# Patient Record
Sex: FEMALE
[De-identification: names, ages, dates, MRNs, and addresses within clinical notes are randomized per-mention and may not be internally consistent; named-entity substitution may affect disease eponyms.]

---

## 2018-03-14 ENCOUNTER — HOSPITAL ENCOUNTER (INPATIENT)
Dept: HOSPITAL 88 - ER | Age: 78
LOS: 3 days | Discharge: HOME HEALTH | DRG: 65 | End: 2018-03-17
Attending: INTERNAL MEDICINE | Admitting: INTERNAL MEDICINE
Payer: MEDICARE

## 2018-03-14 VITALS — DIASTOLIC BLOOD PRESSURE: 84 MMHG | SYSTOLIC BLOOD PRESSURE: 134 MMHG

## 2018-03-14 VITALS — SYSTOLIC BLOOD PRESSURE: 127 MMHG | DIASTOLIC BLOOD PRESSURE: 64 MMHG

## 2018-03-14 VITALS — HEIGHT: 63 IN | BODY MASS INDEX: 27.66 KG/M2 | WEIGHT: 156.13 LBS

## 2018-03-14 DIAGNOSIS — Z95.5: ICD-10-CM

## 2018-03-14 DIAGNOSIS — I11.9: ICD-10-CM

## 2018-03-14 DIAGNOSIS — I25.10: ICD-10-CM

## 2018-03-14 DIAGNOSIS — I63.511: Primary | ICD-10-CM

## 2018-03-14 DIAGNOSIS — H53.461: ICD-10-CM

## 2018-03-14 DIAGNOSIS — I69.351: ICD-10-CM

## 2018-03-14 DIAGNOSIS — I48.2: ICD-10-CM

## 2018-03-14 DIAGNOSIS — Z79.01: ICD-10-CM

## 2018-03-14 LAB
ALBUMIN SERPL-MCNC: 4.5 G/DL (ref 3.5–5)
ALBUMIN/GLOB SERPL: 1 {RATIO} (ref 0.8–2)
ALP SERPL-CCNC: 75 IU/L (ref 40–150)
ALT SERPL-CCNC: 12 IU/L (ref 0–55)
ANION GAP SERPL CALC-SCNC: 15.1 MMOL/L (ref 8–16)
BACTERIA URNS QL MICRO: (no result) /HPF
BASOPHILS # BLD AUTO: 0.1 10*3/UL (ref 0–0.1)
BASOPHILS NFR BLD AUTO: 0.5 % (ref 0–1)
BILIRUB UR QL: (no result)
BUN SERPL-MCNC: 19 MG/DL (ref 7–26)
BUN/CREAT SERPL: 23 (ref 6–25)
CALCIUM SERPL-MCNC: 9.6 MG/DL (ref 8.4–10.2)
CHLORIDE SERPL-SCNC: 104 MMOL/L (ref 98–107)
CK MB SERPL-MCNC: 2.1 NG/ML (ref 0–5)
CK SERPL-CCNC: 97 IU/L (ref 29–168)
CLARITY UR: CLEAR
CO2 SERPL-SCNC: 26 MMOL/L (ref 22–29)
COLOR UR: YELLOW
DEPRECATED APTT PLAS QN: 29.3 SECONDS (ref 23.8–35.5)
DEPRECATED INR PLAS: 1.18
DEPRECATED NEUTROPHILS # BLD AUTO: 5.9 10*3/UL (ref 2.1–6.9)
DEPRECATED RBC URNS MANUAL-ACNC: (no result) /HPF (ref 0–5)
EGFRCR SERPLBLD CKD-EPI 2021: > 60 ML/MIN (ref 60–?)
EOSINOPHIL # BLD AUTO: 0.1 10*3/UL (ref 0–0.4)
EOSINOPHIL NFR BLD AUTO: 0.5 % (ref 0–6)
EPI CELLS URNS QL MICRO: (no result) /LPF
ERYTHROCYTE [DISTWIDTH] IN CORD BLOOD: 13.8 % (ref 11.7–14.4)
GLOBULIN PLAS-MCNC: 4.6 G/DL (ref 2.3–3.5)
GLUCOSE SERPLBLD-MCNC: 101 MG/DL (ref 74–118)
HCT VFR BLD AUTO: 41.4 % (ref 34.2–44.1)
HGB BLD-MCNC: 14.4 G/DL (ref 12–16)
KETONES UR QL STRIP.AUTO: NEGATIVE
LEUKOCYTE ESTERASE UR QL STRIP.AUTO: (no result)
LYMPHOCYTES # BLD: 2.9 10*3/UL (ref 1–3.2)
LYMPHOCYTES NFR BLD AUTO: 30.5 % (ref 18–39.1)
MCH RBC QN AUTO: 32.1 PG (ref 28–32)
MCHC RBC AUTO-ENTMCNC: 34.8 G/DL (ref 31–35)
MCV RBC AUTO: 92.2 FL (ref 81–99)
MONOCYTES # BLD AUTO: 0.5 10*3/UL (ref 0.2–0.8)
MONOCYTES NFR BLD AUTO: 5 % (ref 4.4–11.3)
MUCOUS THREADS URNS QL MICRO: (no result)
NEUTS SEG NFR BLD AUTO: 63.3 % (ref 38.7–80)
NITRITE UR QL STRIP.AUTO: NEGATIVE
PLATELET # BLD AUTO: 314 X10E3/UL (ref 140–360)
POTASSIUM SERPL-SCNC: 4.1 MMOL/L (ref 3.5–5.1)
PROT UR QL STRIP.AUTO: (no result)
PROTHROMBIN TIME: 14.1 SECONDS (ref 11.9–14.5)
RBC # BLD AUTO: 4.49 X10E6/UL (ref 3.6–5.1)
SODIUM SERPL-SCNC: 141 MMOL/L (ref 136–145)
SP GR UR STRIP: 1.02 (ref 1.01–1.02)
UROBILINOGEN UR STRIP-MCNC: 0.2 MG/DL (ref 0.2–1)
WBC #/AREA URNS HPF: (no result) /HPF (ref 0–5)

## 2018-03-14 PROCEDURE — 99284 EMERGENCY DEPT VISIT MOD MDM: CPT

## 2018-03-14 PROCEDURE — 85025 COMPLETE CBC W/AUTO DIFF WBC: CPT

## 2018-03-14 PROCEDURE — 83880 ASSAY OF NATRIURETIC PEPTIDE: CPT

## 2018-03-14 PROCEDURE — 84484 ASSAY OF TROPONIN QUANT: CPT

## 2018-03-14 PROCEDURE — 70450 CT HEAD/BRAIN W/O DYE: CPT

## 2018-03-14 PROCEDURE — 71045 X-RAY EXAM CHEST 1 VIEW: CPT

## 2018-03-14 PROCEDURE — 84439 ASSAY OF FREE THYROXINE: CPT

## 2018-03-14 PROCEDURE — 85610 PROTHROMBIN TIME: CPT

## 2018-03-14 PROCEDURE — 83735 ASSAY OF MAGNESIUM: CPT

## 2018-03-14 PROCEDURE — 82550 ASSAY OF CK (CPK): CPT

## 2018-03-14 PROCEDURE — 93306 TTE W/DOPPLER COMPLETE: CPT

## 2018-03-14 PROCEDURE — 81001 URINALYSIS AUTO W/SCOPE: CPT

## 2018-03-14 PROCEDURE — 85730 THROMBOPLASTIN TIME PARTIAL: CPT

## 2018-03-14 PROCEDURE — 80061 LIPID PANEL: CPT

## 2018-03-14 PROCEDURE — 93005 ELECTROCARDIOGRAM TRACING: CPT

## 2018-03-14 PROCEDURE — 93880 EXTRACRANIAL BILAT STUDY: CPT

## 2018-03-14 PROCEDURE — 82553 CREATINE MB FRACTION: CPT

## 2018-03-14 PROCEDURE — 80048 BASIC METABOLIC PNL TOTAL CA: CPT

## 2018-03-14 PROCEDURE — 36415 COLL VENOUS BLD VENIPUNCTURE: CPT

## 2018-03-14 PROCEDURE — 84443 ASSAY THYROID STIM HORMONE: CPT

## 2018-03-14 PROCEDURE — 87086 URINE CULTURE/COLONY COUNT: CPT

## 2018-03-14 PROCEDURE — 80053 COMPREHEN METABOLIC PANEL: CPT

## 2018-03-14 RX ADMIN — ENOXAPARIN SODIUM SCH MG: 60 INJECTION SUBCUTANEOUS at 20:41

## 2018-03-14 NOTE — DIAGNOSTIC IMAGING REPORT
PROCEDURE:

A single AP view of the chest.

 

COMPARISON: None.

 

INDICATIONS:   POSSIBLE STROKE

     

FINDINGS:

Lines/tubes:  None.

 

Lungs:  The lungs are well inflated and clear. There is no evidence of 

pneumonia or pulmonary edema.

 

Pleura:  There is no pleural effusion or pneumothorax.

 

Heart and mediastinum: Mild cardiomegaly. Atherosclerotic 

calcifications in aorta. Mild main pulmonary artery enlargement.

 

Bones:  No acute bony abnormality.

 

IMPRESSION: 

 

Mild cardiomegaly. No acute cardiopulmonary disease.

 

Dictated by:  Nathan Higginbotham M.D. on 3/14/2018 at 11:59     

Electronically approved by:  Nathan Higginbotham M.D. on 3/14/2018 at 11:59

## 2018-03-14 NOTE — DIAGNOSTIC IMAGING REPORT
Exam: Head CT without contrast

History:  Headache

Comparison studies:  None



Technique:

Axial images were obtained from the skull base to the vertex.

Coronal and sagittal images reconstructed from the axial data.

Intravenous contrast: None



Findings:



Scalp: No abnormalities.

Bones: No fractures, blastic or lytic lesions.



Brain sulci: Right lateral parietal and occipital sulci are effaced (see below.

Remain sulci are mild prominent.



Ventricles: The posterior right lateral ventricle is partially effaced. Mild to

been stored dilatation remaining ventricles. No hydrocephalus.



Extra-axial spaces: No masses, no fluid collection. 



Parenchyma: 

Wedge-shaped hypodensity compatible with recent, moderate size infarct centered

in the right inferior parietal lobule extends anteriorly to the posterior right

insula and right superior temporal lobe and posteriorly to the right lateral

occipital lobe in the distal right MCA territory. Infarct extends to the right

MCA and PCA cortical border zone. There is no lyssa hemorrhagic conversion at

this time. There are a few linear hyperdense vessels within the infarct bed

which may possibly reflect small thrombosed vessels within the distal right MCA

territory.



There is a small chronic lacunar infarct in the left anterolateral putamen.

Scattered discrete and ill-defined ill-defined confluent hypodensities in the

supratentorial white matter are nonspecific but most compatible with chronic

small vessel ischemic changes.



Sellar/suprasellar region: No abnormalities.

Craniocervical junction: Patent foramen magnum. No Chiari one malformation.



Incidental findings: 

Atherosclerotic calcifications in the carotid siphons, right intradural

vertebral artery and proximal left A2 JADA segment.



IMPRESSION:

 

Recent (acute to early subacute), moderate size, nonhemorrhagic vascular insult

in the distal right MCA territory extends to the right MCA-PCA cortical border

zone with local mass effect without midline shift, herniation or hydrocephalus.



Chronic findings:

1.  Mild generalized volume loss.

2.  Moderate chronic microvascular ischemic changes with small chronic left

putaminal lacunar infarct.





Findings discussed with Dr. Apple at 12:46 PM on 3/14/2018.



Signed by: Dr. Gino Arciniega M.D. on 3/14/2018 12:50 PM

## 2018-03-14 NOTE — XMS REPORT
Patient Summary Document

 Created on: 2018



NAPOLEON TEJADA

External Reference #: [id number]

: 1940

Sex: Female



Demographics







 Address  [id number] [place] [address]  [address]

 

 Home Phone  [phone]

 

 Preferred Language  Unknown

 

 Marital Status  Unknown

 

 Restorationist Affiliation  Unknown

 

 Race  Unknown

 

 Additional Race(s)  

 

 Ethnic Group  Unknown





Author







 [facility]  [facility]

 

 [facility]

 

 Address  Unknown

 

 Phone  Unavailable







Care Team Providers







 Care Team Member Name  Role  Phone

 

 JOHN DUFFY  Unavailable  Unavailable







Problems

This patient has no known problems.



Allergies, Adverse Reactions, Alerts

This patient has no known allergies or adverse reactions.



Medications

This patient has no known medications.



Results







 Test Description  Test Time  Test Comments  Text Results  Atomic Results  
Result Comments









 CHEST SINGLE (PORTABLE)            [place] [address]      Patient Name: NAPOLEON TEJADA   MR #: [id number]    : 1940 Age/Sex: 77/F  Acct #: 
[id number] Req #: [id number]  Adm Physician:     Ordered by: JOHN DUFFY MD  Report #: [id number]   Location: ER  Room/Bed:     ________________________
___________________________________________________________________________    
Procedure: [id number] DX/CHEST SINGLE (PORTABLE)  Exam Date: 18           
                 Exam Time: 1145       REPORT STATUS: Signed    PROCEDURE:   A 
single AP view of the chest.       COMPARISON: None.       INDICATIONS:   
POSSIBLE STROKE           FINDINGS:   Lines/tubes:  None.       Lungs:  The 
lungs are well inflated and clear. There is no evidence of    pneumonia or 
pulmonary edema.       Pleura:  There is no pleural effusion or pneumothorax.  
     Heart and mediastinum: Mild cardiomegaly. Atherosclerotic    
calcifications in aorta. Mild main pulmonary artery enlargement.       Bones:  
No acute bony abnormality.       IMPRESSION:        Mild cardiomegaly. No acute 
cardiopulmonary disease.       Dictated by:  Saige Higginbotham M.D. on 3/14/2018 at 11:
59        Electronically approved by:  Saige Higginbotham M.D. on 3/14/2018 at 11:59     
           Dictated By: SAIGE HIGGINBOTHAM MD  Electronically Signed By: SAIGE HIGGINBOTHAM MD on  1159  Transcribed By: THALIA on 18 1159       COPY TO:   JOHN DUFFY MD           

 

 CT BRAIN WO            [place] [address]      Patient Name: NAPOLEON TEJADA   MR #: [id number]    : 1940 Age/Sex: 77/F  Acct #: 
[id number] Req #: [id number]  Adm Physician:     Ordered by: JOHN DUFFY MD  Report #: [id number]   Location: ER  Room/Bed:     ________________________
___________________________________________________________________________    
Procedure: [id number] CT/CT BRAIN WO  Exam Date: 18                       
     Exam Time: 1215       REPORT STATUS: Signed    Exam: Head CT without 
contrast   History:  Headache   Comparison studies:  None      Technique:   
Axial images were obtained from the skull base to the vertex.   Coronal and 
sagittal images reconstructed from the axial data.   Intravenous contrast: None
      Findings:      Scalp: No abnormalities.   Bones: No fractures, blastic or 
lytic lesions.      Brain sulci: Right lateral parietal and occipital sulci are 
effaced (see below.   Remain sulci are mild prominent.      Ventricles: The 
posterior right lateral ventricle is partially effaced. Mild to   been stored 
dilatation remaining ventricles. No hydrocephalus.      Extra-axial spaces: No 
masses, no fluid collection.       Parenchyma:    Wedge-shaped hypodensity 
compatible with recent, moderate size infarct centered   in the right inferior 
parietal lobule extends anteriorly to the posterior right   insula and right 
superior temporal lobe and posteriorly to the right lateral   occipital lobe in 
the distal right MCA territory. Infarct extends to the right   MCA and PCA 
cortical border zone. There is no lyssa hemorrhagic conversion at   this time. 
There are a few linear hyperdense vessels within the infarct bed   which may 
possibly reflect small thrombosed vessels within the distal right MCA   
territory.      There is a small chronic lacunar infarct in the left 
anterolateral putamen.   Scattered discrete and ill-defined ill-defined 
confluent hypodensities in the   supratentorial white matter are nonspecific 
but most compatible with chronic   small vessel ischemic changes.      Sellar/
suprasellar region: No abnormalities.   Craniocervical junction: Patent foramen 
magnum. No Chiari one malformation.      Incidental findings:    
Atherosclerotic calcifications in the carotid siphons, right intradural   
vertebral artery and proximal left A2 JADA segment.      IMPRESSION:       
Recent (acute to early subacute), moderate size, nonhemorrhagic vascular insult
   in the distal right MCA territory extends to the right MCA-PCA cortical 
border   zone with local mass effect without midline shift, herniation or 
hydrocephalus.      Chronic findings:   1.  Mild generalized volume loss.   2.  
Moderate chronic microvascular ischemic changes with small chronic left   
putaminal lacunar infarct.         Findings discussed with Dr. Apple at 12:
46 PM on 3/14/2018.      Signed by: Dr. Farzad Arciniega M.D. on 3/14/2018 12:50 
PM        Dictated By: FARZAD ARCINIEGA MD  Electronically Signed By: FARZAD ARCINIEGA MD on 18 1250  Transcribed By: GIOVANNI on 18 1250       
COPY TO:   JOHN DUFFY MD

## 2018-03-15 VITALS — DIASTOLIC BLOOD PRESSURE: 67 MMHG | SYSTOLIC BLOOD PRESSURE: 137 MMHG

## 2018-03-15 VITALS — DIASTOLIC BLOOD PRESSURE: 99 MMHG | SYSTOLIC BLOOD PRESSURE: 127 MMHG

## 2018-03-15 VITALS — SYSTOLIC BLOOD PRESSURE: 149 MMHG | DIASTOLIC BLOOD PRESSURE: 89 MMHG

## 2018-03-15 VITALS — DIASTOLIC BLOOD PRESSURE: 74 MMHG | SYSTOLIC BLOOD PRESSURE: 144 MMHG

## 2018-03-15 VITALS — DIASTOLIC BLOOD PRESSURE: 74 MMHG | SYSTOLIC BLOOD PRESSURE: 133 MMHG

## 2018-03-15 RX ADMIN — METOPROLOL SUCCINATE SCH MG: 50 TABLET, EXTENDED RELEASE ORAL at 10:05

## 2018-03-15 RX ADMIN — RIVAROXABAN SCH MG: 10 TABLET, FILM COATED ORAL at 18:00

## 2018-03-15 RX ADMIN — Medication SCH MG: at 10:01

## 2018-03-15 RX ADMIN — ENOXAPARIN SODIUM SCH MG: 60 INJECTION SUBCUTANEOUS at 10:04

## 2018-03-15 RX ADMIN — FAMOTIDINE SCH MG: 20 TABLET, FILM COATED ORAL at 16:56

## 2018-03-15 RX ADMIN — FAMOTIDINE SCH MG: 20 TABLET, FILM COATED ORAL at 10:00

## 2018-03-15 RX ADMIN — RIVAROXABAN SCH MG: 10 TABLET, FILM COATED ORAL at 18:39

## 2018-03-15 NOTE — HISTORY AND PHYSICAL
PRIMARY CARE PROVIDER:  Dr. Lee ____, who does not come to this 

hospital.  



CHIEF COMPLAINT:  Confusion and transient left-sided weakness.



HISTORY OF PRESENT ILLNESS: Ms. Tobar is a 77-year-old lady who yesterday 

in the morning had some transient left-sided weakness where she was leaning 

towards the left side, having difficulty walking.  This cleared up after an 

hour or so but the patient became a little  confused.  She was putting her 

clothes on backwards and so was brought to the ER for evaluation.  In the 

ED, a CT scan showed what looked like an acute or subacute right middle 

cerebral artery stroke.  



REVIEW OF SYSTEMS:  She denies fever, chills or weight loss.  Denies sinus 

congestion or sore throat.  She denies chest pain or palpitations.  She 

denies shortness of breath, wheezing or cough.  She denies abdominal pain, 

nausea, vomiting, diarrhea or melena.  She denies dysuria or flank pain.  

Denies rash or pruritus.  She denies joint pain or swelling.  She denies 

headache or vertigo.  She had some transient left-sided weakness.  She 

denies loss of consciousness.  She is a little confused.  She denies 

depression, agitation, homicidal or suicidal ideation.

,

PAST MEDICAL HISTORY:  Significant for longstanding hypertension and 

coronary artery disease.  She has had a coronary stent.  She is not clear 

on the details.  It was many years ago.  She had chronic atrial 

fibrillation for several years.  She had a previous stroke with right-sided 

weakness that resolved.



HOME MEDICATIONS:  Metoprolol ER 50 mg daily.  Nifedipine ER 60 mg twice 

daily.  Chlorthalidone 12.5 mg daily.  Coumadin 4.5 mg daily.  She has a 

history of cholecystectomy many years ago.  She has had bilateral 

lumpectomy and radiation on both breasts back in 2001.  She had a coronary 

stent as noted above. 



ALLERGIES:  NO KNOWN DRUG ALLERGIES.  



FAMILY HISTORY:  Significant for hypertension.



SOCIAL HISTORY:  The patient is .  She is bilingual.  She 

does speak fairly good English.  She does not smoke, drink or use illegal 

drugs.  She is generally independently functioning.



PHYSICAL EXAM:   

PSYCHIATRIC:  She is alert and oriented times 3 with normal mood and 

affect. 

CONSTITUTIONAL:  She has a normal body habitus.  Is in no acute distress. 

VITAL SIGNS:  Blood pressure initially 140/65, currently 137/67, heart rate 

initially 105 and currently 85 and irregular.  Respiratory rate 16 and 

unlabored.  O2 saturation 96% on room air.  Temperature initially 97.3, T 

max overnight 99.9, currently 99.2. 

HEENT:  Head is atraumatic.  Eyes are anicteric with clear conjunctivae.  

Ears and nares are without erythema or discharge.  Oropharynx is clear. 

NECK:  Is supple with no mass or thyromegaly. 

LYMPHATIC SYSTEM:  She has no palpable cervical, axillary or inguinal 

adenopathy.  

CARDIOVASCULAR:  Heart has an irregular, irregular rhythm without murmur or 

extra heart sounds.  She has no carotid bruit.  She has no peripheral 

edema.  Has palpable dorsal pedal pulses. 

RESPIRATORY:  Clear to auscultation and percussion with normal respiratory 

effort. 

GASTROINTESTINAL:  Abdomen is soft without organomegaly, masses or 

tenderness.  Normal bowel sounds present. 

CUTANEOUS:  Her skin is warm and dry to touch with no rash or skin 

breakdown. 

MUSCULOSKELETAL:  Joints are normal alignment without erythema or swelling. 

 She has no calf tenderness. 

NEUROLOGIC:  Exam is nonfocal.  Patient has symmetric strength and normal 

reflexes.  She appears to be alert and oriented times 3, essentially fairly 

close to her baseline according to her family.  Nonfocal with no motor or 

sensory deficits. 



DIAGNOSTIC STUDIES:  Chest x-ray shows mild cardiomegaly, otherwise no 

acute disease.  CT scan of brain shows what appears to be an acute or 

subacute recent, in any case right middle cerebral artery stroke, distal 

right middle cerebral artery distribution, but fairly large area effected.  

She also has some chronic changes, some generalized volume loss, some 

microvascular changes in the deep white matter and a lacunar infarct in the 

left putamen.  Her UA has 6 to 10 white cells.  Cultures pending.  Troponin 

0.001.  Her chemistry shows normal electrolytes.  CO2 26.  Creatinine 0.83. 

 BUN 19.  Glucose 101.  Calcium 9.6.  Transaminases, bilirubin and alkaline 

phos are normal.  CBC shows a white count of 9.35 with a normal 

differential.  Hemoglobin 14.4, hematocrit 41.4 and platelet count 314,000. 

 Coags are normal with a pro time of 14.1 and INR of 1.18.  



IMPRESSION AND PLAN

1. Acute or subacute right middle cerebral artery stroke.  Patient has 

been given aspirin, started on aspirin daily and will start on Lovenox, 

weight-based, 60 mg subcutaneous q.12 hours.  Neurology has been 

consulted and PT and OT have been consulted for evaluation.

2. Hypertension complicated by coronary artery disease.  Will continue 

the metoprolol.  The patient's blood pressure 137 systolic.  Will hold 

her Procardia for now and let blood pressure run a little bit high in 

view of the recent stroke.  We may need to adjust her blood pressure 

medications prior to discharge, like perhaps change the Procardia to 

Norvasc 10 mg daily. I think the Procardia is too strong, but for right 

now, will let her blood pressure run at least with the systolic pressure 

130 or above.  

3. Chronic atrial fibrillation.  Her rate is controlled.  Will continue 

her metoprolol.  Will stop the warfarin and change to Xarelto 20 mg 

daily for better compliance.  Once the Xarelto has been started, will 

stop the Lovenox.  

4. For prophylaxis the patient is getting DVT prophylaxis with 

subcutaneous Lovenox transitioning to Xarelto for stroke prophylaxis and 

Pepcid for GI prophylaxis.



  







DD:  03/15/2018 17:23

DT:  03/15/2018 18:34

Job#:  [id number]

## 2018-03-16 VITALS — DIASTOLIC BLOOD PRESSURE: 88 MMHG | SYSTOLIC BLOOD PRESSURE: 148 MMHG

## 2018-03-16 VITALS — DIASTOLIC BLOOD PRESSURE: 84 MMHG | SYSTOLIC BLOOD PRESSURE: 144 MMHG

## 2018-03-16 VITALS — DIASTOLIC BLOOD PRESSURE: 73 MMHG | SYSTOLIC BLOOD PRESSURE: 162 MMHG

## 2018-03-16 VITALS — DIASTOLIC BLOOD PRESSURE: 79 MMHG | SYSTOLIC BLOOD PRESSURE: 148 MMHG

## 2018-03-16 VITALS — DIASTOLIC BLOOD PRESSURE: 81 MMHG | SYSTOLIC BLOOD PRESSURE: 178 MMHG

## 2018-03-16 VITALS — SYSTOLIC BLOOD PRESSURE: 171 MMHG | DIASTOLIC BLOOD PRESSURE: 82 MMHG

## 2018-03-16 LAB
ANION GAP SERPL CALC-SCNC: 9.5 MMOL/L (ref 8–16)
BASOPHILS # BLD AUTO: 0.1 10*3/UL (ref 0–0.1)
BASOPHILS NFR BLD AUTO: 0.6 % (ref 0–1)
BUN SERPL-MCNC: 13 MG/DL (ref 7–26)
BUN/CREAT SERPL: 16 (ref 6–25)
CALCIUM SERPL-MCNC: 8.9 MG/DL (ref 8.4–10.2)
CHLORIDE SERPL-SCNC: 104 MMOL/L (ref 98–107)
CHOLEST SERPL-MCNC: 127 MD/DL (ref 0–199)
CHOLEST/HDLC SERPL: 3.3 {RATIO} (ref 3–3.6)
CO2 SERPL-SCNC: 28 MMOL/L (ref 22–29)
DEPRECATED NEUTROPHILS # BLD AUTO: 5.4 10*3/UL (ref 2.1–6.9)
EGFRCR SERPLBLD CKD-EPI 2021: > 60 ML/MIN (ref 60–?)
EOSINOPHIL # BLD AUTO: 0.1 10*3/UL (ref 0–0.4)
EOSINOPHIL NFR BLD AUTO: 1.2 % (ref 0–6)
ERYTHROCYTE [DISTWIDTH] IN CORD BLOOD: 13.3 % (ref 11.7–14.4)
GLUCOSE SERPLBLD-MCNC: 105 MG/DL (ref 74–118)
HCT VFR BLD AUTO: 39.6 % (ref 34.2–44.1)
HDLC SERPL-MSCNC: 38 MG/DL (ref 40–60)
HGB BLD-MCNC: 13.6 G/DL (ref 12–16)
LDLC SERPL CALC-MCNC: 72 MG/DL (ref 60–130)
LYMPHOCYTES # BLD: 2.8 10*3/UL (ref 1–3.2)
LYMPHOCYTES NFR BLD AUTO: 31.1 % (ref 18–39.1)
MAGNESIUM SERPL-MCNC: 1.8 MG/DL (ref 1.3–2.1)
MCH RBC QN AUTO: 31.8 PG (ref 28–32)
MCHC RBC AUTO-ENTMCNC: 34.3 G/DL (ref 31–35)
MCV RBC AUTO: 92.5 FL (ref 81–99)
MONOCYTES # BLD AUTO: 0.7 10*3/UL (ref 0.2–0.8)
MONOCYTES NFR BLD AUTO: 7.2 % (ref 4.4–11.3)
NEUTS SEG NFR BLD AUTO: 59.7 % (ref 38.7–80)
PLATELET # BLD AUTO: 262 X10E3/UL (ref 140–360)
POTASSIUM SERPL-SCNC: 3.5 MMOL/L (ref 3.5–5.1)
RBC # BLD AUTO: 4.28 X10E6/UL (ref 3.6–5.1)
SODIUM SERPL-SCNC: 138 MMOL/L (ref 136–145)
T4 FREE SERPL-MCNC: 1.1 NG/DL (ref 0.9–1.8)
TRIGL SERPL-MCNC: 85 MG/DL (ref 0–149)
TSH SERPL DL<=0.005 MIU/L-ACNC: 4.51 UIU/ML (ref 0.35–4.94)

## 2018-03-16 RX ADMIN — RIVAROXABAN SCH MG: 10 TABLET, FILM COATED ORAL at 17:07

## 2018-03-16 RX ADMIN — FAMOTIDINE SCH MG: 20 TABLET, FILM COATED ORAL at 08:47

## 2018-03-16 RX ADMIN — FAMOTIDINE SCH MG: 20 TABLET, FILM COATED ORAL at 17:07

## 2018-03-16 RX ADMIN — Medication SCH MG: at 08:47

## 2018-03-16 RX ADMIN — METOPROLOL SUCCINATE SCH MG: 50 TABLET, EXTENDED RELEASE ORAL at 08:47

## 2018-03-16 NOTE — CONSULTATION
DATE OF CONSULTATION:  March 14, 2018 at 4:30 p.m. 



NEUROLOGICAL CONSULTATION 

 

This 77-year-old female was brought to the hospital because of a 2-day 

history she was complaining of some vision problems and weakness of the 

left arm and left leg.  There was no headache, no dizziness, no speech or 

swallowing difficulty.  



PAST MEDICAL HISTORY:  Hypertension, atrial fibrillation, chronic coronary 

artery disease.  She has had a stent.  Previous history of CVA on the left 

side with residual right hemiparesis which has recovered very well.  



MEDICATIONS:  Metoprolol 50 mg daily, nifedipine, warfarin.  Recent 

medications have been reviewed.  



INR today on the day of admission 1.18, which is on the low side.  



REVIEW OF SYSTEMS:  All 12 steps negative, except as described above.  



PHYSICAL EXAMINATION 

VITAL SIGNS:  Blood pressure 140/70, pulse 72, afebrile. 

LUNGS:  Clear to auscultation. 

HEART:  Regular sinus rhythm, no murmur. 

ABDOMEN:  Soft, nontender with no organomegaly.  

MUSCULOSKELETAL:  Lower extremities with no edema, no cyanosis, no 

clubbing.  

NEUROLOGIC:  At the time of this examination in the emergency room, she is 

sitting comfortably, eating.  She is alert.  She is oriented x3.  Speech is 

clear with no dysarthria or dysphasia.  

Cranial nerves:  Pupils are both equal and reactive.  Extraocular movements 

were full.  Visual fields, questionable left homonymous hemianopsia.  No 

dysphasia.  No facial weakness.  Tongue protrudes in the midline.  

Motor power:  The patient is able to elevate arms and legs against gravity, 

approximately 80 degrees without difficulty.  Flexion and extension of the 

knees 5/5.  Flexion and extension of the ankles 5/5.  ______ 5/5.  

Deep tendon reflexes:  Triceps, biceps, radialis 1+.  Knee jerk 1+.  _____ 

absent bilaterally.  Plantar stimulation down bilaterally.  

HEAD:  Normocephalic. 

NECK:  Supple. Carotid pulsations were present bilaterally.  There were no 

bruits.  



LABORATORY DATA:  WBC 3,900 with hemoglobin 13.3, hematocrit 39.6, 

platelets 262,000. Comprehensive metabolic panel:  Sodium, potassium and 

chloride all normal.  BUN 19, creatinine 0.83.  Estimated GFR greater than 

60.  Triglycerides 85, chloride 177.  Urinalysis with WBCs 6-10.  



CT scan of the brain showed acute moderate sized non-hemorrhagic ischemic 

stroke in the right MCA, extending to the posterior cerebral artery with 

minimal mass affect.  



IMPRESSION 

1. Subacute right middle cerebral infarction.  

2. Atrial fibrillation. 

3. Hypertension. 

4. Right homonymous hemianopsia, partial.  





RECOMMENDATIONS:  I have reviewed the CAT scan of the brain and will get 

speech therapy and physical therapy, ambulation, and will do a carotid 

Doppler and discuss with the attending physician.  The family has been 

explained in detail.  









DD:  03/16/2018 13:08

DT:  03/16/2018 15:14

Job#:  [id number]

## 2018-03-17 VITALS — DIASTOLIC BLOOD PRESSURE: 94 MMHG | SYSTOLIC BLOOD PRESSURE: 148 MMHG

## 2018-03-17 VITALS — SYSTOLIC BLOOD PRESSURE: 148 MMHG | DIASTOLIC BLOOD PRESSURE: 79 MMHG

## 2018-03-17 VITALS — DIASTOLIC BLOOD PRESSURE: 81 MMHG | SYSTOLIC BLOOD PRESSURE: 144 MMHG

## 2018-03-17 LAB
ANION GAP SERPL CALC-SCNC: 12.7 MMOL/L (ref 8–16)
BASOPHILS # BLD AUTO: 0.1 10*3/UL (ref 0–0.1)
BASOPHILS NFR BLD AUTO: 0.7 % (ref 0–1)
BUN SERPL-MCNC: 15 MG/DL (ref 7–26)
BUN/CREAT SERPL: 17 (ref 6–25)
CALCIUM SERPL-MCNC: 9.9 MG/DL (ref 8.4–10.2)
CHLORIDE SERPL-SCNC: 104 MMOL/L (ref 98–107)
CO2 SERPL-SCNC: 24 MMOL/L (ref 22–29)
DEPRECATED NEUTROPHILS # BLD AUTO: 4.8 10*3/UL (ref 2.1–6.9)
EGFRCR SERPLBLD CKD-EPI 2021: > 60 ML/MIN (ref 60–?)
EOSINOPHIL # BLD AUTO: 0.2 10*3/UL (ref 0–0.4)
EOSINOPHIL NFR BLD AUTO: 1.8 % (ref 0–6)
ERYTHROCYTE [DISTWIDTH] IN CORD BLOOD: 13.2 % (ref 11.7–14.4)
GLUCOSE SERPLBLD-MCNC: 102 MG/DL (ref 74–118)
HCT VFR BLD AUTO: 44 % (ref 34.2–44.1)
HGB BLD-MCNC: 15.2 G/DL (ref 12–16)
LYMPHOCYTES # BLD: 2.8 10*3/UL (ref 1–3.2)
LYMPHOCYTES NFR BLD AUTO: 33.4 % (ref 18–39.1)
MAGNESIUM SERPL-MCNC: 2 MG/DL (ref 1.3–2.1)
MCH RBC QN AUTO: 31.6 PG (ref 28–32)
MCHC RBC AUTO-ENTMCNC: 34.5 G/DL (ref 31–35)
MCV RBC AUTO: 91.5 FL (ref 81–99)
MONOCYTES # BLD AUTO: 0.5 10*3/UL (ref 0.2–0.8)
MONOCYTES NFR BLD AUTO: 6.2 % (ref 4.4–11.3)
NEUTS SEG NFR BLD AUTO: 57.5 % (ref 38.7–80)
PLATELET # BLD AUTO: 269 X10E3/UL (ref 140–360)
POTASSIUM SERPL-SCNC: 3.7 MMOL/L (ref 3.5–5.1)
RBC # BLD AUTO: 4.81 X10E6/UL (ref 3.6–5.1)
SODIUM SERPL-SCNC: 137 MMOL/L (ref 136–145)

## 2018-03-17 RX ADMIN — METOPROLOL SUCCINATE SCH MG: 50 TABLET, EXTENDED RELEASE ORAL at 09:13

## 2018-03-17 RX ADMIN — Medication SCH MG: at 09:12

## 2018-03-17 RX ADMIN — FAMOTIDINE SCH MG: 20 TABLET, FILM COATED ORAL at 09:12

## 2018-03-18 NOTE — DISCHARGE SUMMARY
ADMISSION DIAGNOSES 

1.  Acute or subacute right middle cerebral artery stroke.

2.  Hypertension complicated by coronary artery disease.

3.  Chronic atrial fibrillation.



DISCHARGE DIAGNOSES 

1.  Acute or subacute right middle cerebral artery stroke.

2.  Hypertension complicated by coronary artery disease.

3.  Chronic atrial fibrillation.



The patient has a history of hypertension, CAD with stent, chronic AFib for 

several years, previous stroke with right-sided weakness that resolved.



HOSPITAL COURSE:  A 77-year-old female presented with transient weakness 

where she was leaning towards the left side and having difficulty walking.  

This cleared up about an hour or so later, but then the patient became 

confused.  She was putting on her clothes backwards.  Brought to the ER for 

evaluation.  On admission, the patient had an EKG which showed AFib with 

RVR.  She had a chest x-ray, which showed mild cardiomegaly.  No acute 

cardiopulmonary disease.  CT of the brain that showed what appears to be an 

acute or subacute right middle cerebral artery stroke, distal right middle 

cerebral artery distribution, but fairly large area affected.  The patient 

was given aspirin and started on aspirin daily, and started on Lovenox 

weight-based.  Neurology was consulted, and PT and OT were consulted for 

_______.  For the hypertension, the patient was continued on _______, but 

her Procardia was held to let her blood pressure run a little bit high in 

view of the recent stroke.  At the time of discharge, was given Norvasc 

instead of Procardia.  She was also on Coumadin at home, but at discharge 

it was changed to Xarelto for better compliance.  The patient was evaluated 

by PT and was able to complete ADLs by herself.  She worked well with 

speech therapy.  No problems swallowing.  Carotid Doppler was done that 

showed evidence of carotid disease without carotid stenosis bilaterally.  

Echo was also done that showed an EF of 55% to 60%.  The patient is okay to 

discharge home on current hospital medications, including Xarelto, aspirin, 

metoprolol, Norvasc.  The patient lives with family members.  She does have 

a walker at home, although she does not use it.  In the hospital, she did 

not need any DME.  She will return home with family, and follow up with 

neurology in 1-2 weeks and PCP in 1-2 weeks.  



DICTATED BY VIKTORIYA MOREAU, JULIANA





 



 _________________________________

HEBER CABRERA MD



DD:  03/17/2018 18:27

DT:  03/18/2018 01:11

Job#:  [id number]